# Patient Record
Sex: FEMALE
[De-identification: names, ages, dates, MRNs, and addresses within clinical notes are randomized per-mention and may not be internally consistent; named-entity substitution may affect disease eponyms.]

---

## 2017-07-26 PROBLEM — Z00.00 ENCOUNTER FOR PREVENTIVE HEALTH EXAMINATION: Status: ACTIVE | Noted: 2017-07-26

## 2020-07-22 ENCOUNTER — APPOINTMENT (OUTPATIENT)
Dept: RHEUMATOLOGY | Facility: CLINIC | Age: 60
End: 2020-07-22
Payer: SELF-PAY

## 2020-07-22 ENCOUNTER — APPOINTMENT (OUTPATIENT)
Dept: RHEUMATOLOGY | Facility: CLINIC | Age: 60
End: 2020-07-22

## 2020-07-22 PROCEDURE — 99443: CPT

## 2020-07-22 RX ORDER — HYDROXYCHLOROQUINE SULFATE 200 MG/1
200 TABLET, FILM COATED ORAL DAILY
Qty: 30 | Refills: 6 | Status: ACTIVE | COMMUNITY
Start: 2020-07-22 | End: 1900-01-01

## 2020-07-22 RX ORDER — ADALIMUMAB 40MG/0.4ML
40 KIT SUBCUTANEOUS
Qty: 1 | Refills: 5 | Status: ACTIVE | COMMUNITY
Start: 2020-07-22 | End: 1900-01-01

## 2020-07-22 NOTE — REVIEW OF SYSTEMS
[Arthralgias] : arthralgias [Joint Pain] : joint pain [Joint Swelling] : joint swelling [Joint Stiffness] : joint stiffness [Skin Lesions] : skin lesion [Itching] : itching [Fever] : no fever [Chills] : no chills [Feeling Tired] : not feeling tired [Feeling Poorly] : not feeling poorly [Dry Eyes] : no dryness of the eyes [Shortness Of Breath] : no shortness of breath [SOB on Exertion] : no shortness of breath during exertion [Wheezing] : no wheezing [Cough] : no cough [Diarrhea] : no diarrhea [Abdominal Pain] : no abdominal pain

## 2020-07-22 NOTE — HISTORY OF PRESENT ILLNESS
[Medical Office: ([facility])___] : at the medical office located in  [Home] : at home, [unfilled] , at the time of the visit. [Verbal consent obtained from patient] : the patient, [unfilled] [FreeTextEntry1] : his is a 60-year-old woman she is well known to me she's had rheumatoid arthritis since age 19 most recently she has been taking hydroxychloroquine 400 mg every day she had increases from 200 mg in the past she's been on hydroxychloroquine for approximately 10 years she does see an eye doctor her last eye doctor was within the past year she has not been told of any toxicity she is also been taking Humira she's been on this for well over 5 years she tolerates it well she discontinued it for approximately 3 months during the corona virus pandemic but noticed increasing pain and swelling and has recently restarted it she takes 40 mg every 14 days she tolerates it well she needs refills on this previously she did take Enbrel this was discontinued there was some associated abnormal liver function studies similarly she took Remicade for a period of time and again this was discontinued due to abnormal liver function studies should never been on methotrexate again because of liver issues as well as concerns for pregnancy and the association she is currently doing well but does need refills of her medicines she has occasional stiffness she denies any swellingor any rashes she did have recent blood work which she has made me aware of.

## 2020-07-22 NOTE — ASSESSMENT
[FreeTextEntry1] : this is a 60-year-old woman she has been seen and evaluated by me she did have recent blood work which was all acceptableshe has long-standing rheumatoid arthritis she is on hydroxychloroquine she been on this for approximately 10 years I recommended reduction to 200 mg a day continued on a followup for possible toxicity I have also suggested she continue her Humira every 14 days new prescriptions have been provided.

## 2020-07-22 NOTE — DATA REVIEWED
[FreeTextEntry1] : recent labs these were done May 10, 2020 laboratory studies appear normal thyroid studies were normal vitamin D is normal ESR was 11 CBC and LFTs all appear within normal range.

## 2020-09-08 DIAGNOSIS — M06.9 RHEUMATOID ARTHRITIS, UNSPECIFIED: ICD-10-CM

## 2020-09-08 RX ORDER — HYDROXYCHLOROQUINE SULFATE 200 MG/1
200 TABLET, FILM COATED ORAL
Qty: 60 | Refills: 6 | Status: ACTIVE | COMMUNITY
Start: 2020-09-08 | End: 1900-01-01